# Patient Record
Sex: FEMALE | Race: WHITE | NOT HISPANIC OR LATINO | Employment: FULL TIME | URBAN - METROPOLITAN AREA
[De-identification: names, ages, dates, MRNs, and addresses within clinical notes are randomized per-mention and may not be internally consistent; named-entity substitution may affect disease eponyms.]

---

## 2019-08-17 ENCOUNTER — APPOINTMENT (EMERGENCY)
Dept: RADIOLOGY | Facility: HOSPITAL | Age: 29
End: 2019-08-17
Payer: COMMERCIAL

## 2019-08-17 ENCOUNTER — HOSPITAL ENCOUNTER (EMERGENCY)
Facility: HOSPITAL | Age: 29
Discharge: HOME | End: 2019-08-17
Attending: EMERGENCY MEDICINE | Admitting: EMERGENCY MEDICINE
Payer: COMMERCIAL

## 2019-08-17 VITALS
DIASTOLIC BLOOD PRESSURE: 95 MMHG | SYSTOLIC BLOOD PRESSURE: 140 MMHG | RESPIRATION RATE: 18 BRPM | HEART RATE: 105 BPM | TEMPERATURE: 98.1 F | WEIGHT: 184.08 LBS | OXYGEN SATURATION: 97 %

## 2019-08-17 DIAGNOSIS — S83.005A PATELLAR DISLOCATION, LEFT, INITIAL ENCOUNTER: Primary | ICD-10-CM

## 2019-08-17 PROCEDURE — 99283 EMERGENCY DEPT VISIT LOW MDM: CPT | Performed by: EMERGENCY MEDICINE

## 2019-08-17 PROCEDURE — 99283 EMERGENCY DEPT VISIT LOW MDM: CPT

## 2019-08-17 PROCEDURE — 73564 X-RAY EXAM KNEE 4 OR MORE: CPT

## 2019-08-17 RX ORDER — IBUPROFEN 600 MG/1
600 TABLET ORAL EVERY 8 HOURS PRN
Qty: 15 TABLET | Refills: 0 | Status: SHIPPED | OUTPATIENT
Start: 2019-08-17

## 2019-08-17 RX ORDER — ACETAMINOPHEN 325 MG/1
650 TABLET ORAL ONCE
Status: COMPLETED | OUTPATIENT
Start: 2019-08-17 | End: 2019-08-17

## 2019-08-17 RX ORDER — IBUPROFEN 600 MG/1
600 TABLET ORAL ONCE
Status: COMPLETED | OUTPATIENT
Start: 2019-08-17 | End: 2019-08-17

## 2019-08-17 RX ADMIN — IBUPROFEN 600 MG: 600 TABLET ORAL at 00:23

## 2019-08-17 RX ADMIN — ACETAMINOPHEN 650 MG: 325 TABLET, FILM COATED ORAL at 00:23

## 2019-08-17 NOTE — ED PROVIDER NOTES
History  Chief Complaint   Patient presents with    Knee Pain     Pt  complains of stepping back and feeling of left knee popping out of place, states was put back in by place by person at bar she was at  hx  of dislocation of right knee in the past  denies any other injuries  46-year-old female presents to the emergency department gesturing to the right knee relating "my knee pops out "  She notes that tonight approximately 30 minutes ago while taking a step backwards the left knee "dislocated "  She relates that she fell backwards as a result  She clarifies that the kneecap/patella was dislocated and that someone help to push this into position  This occurred a 2nd time and a friend who had a history of knee injuries supplied her with a full leg brace for support  Patient has been hesitant to and refrained from applying weight to the foot/leg following this  She currently has discomfort in the left knee  She denies any distal discomfort, paresthesias or numbness  No other injuries from today's fall  Medical history significant for hypertension  Patient denies any possibility of pregnancy  None       Past Medical History:   Diagnosis Date    Knee dislocation        History reviewed  No pertinent surgical history  History reviewed  No pertinent family history  I have reviewed and agree with the history as documented  Social History     Tobacco Use    Smoking status: Current Some Day Smoker     Packs/day: 0 20     Types: Cigarettes    Smokeless tobacco: Never Used   Substance Use Topics    Alcohol use: Yes     Comment: socially    Drug use: No        Review of Systems   All other systems reviewed and are negative  Physical Exam  Physical Exam   Constitutional: She is oriented to person, place, and time  She appears well-developed and well-nourished  HENT:   Head: Normocephalic  Eyes: Conjunctivae and EOM are normal    Cardiovascular: Normal rate     Musculoskeletal:   Plus two bilateral PT and DP pulses  There is superficial abrasion to the dorsum of the left foot without bleeding  No tenderness of the left foot/ankle/calf or hip  No tenderness of the popliteal fossa, medial or lateral aspect of the knee  Upon palpation the medial aspect patient notes an unusual sensation tracking inferior to the patella  Patella appears to be in normal alignment  Patient very hesitant to bending the knee  Neurological: She is alert and oriented to person, place, and time  5/5 strength with bilateral hip flexion, dorsi and plantar flexion  Skin: Skin is warm and dry  Psychiatric: She has a normal mood and affect  Her behavior is normal    Nursing note and vitals reviewed  Vital Signs  ED Triage Vitals   Temperature Pulse Respirations Blood Pressure SpO2   08/17/19 0015 08/17/19 0015 08/17/19 0015 08/17/19 0015 08/17/19 0015   98 1 °F (36 7 °C) 105 18 140/95 97 %      Temp Source Heart Rate Source Patient Position - Orthostatic VS BP Location FiO2 (%)   08/17/19 0015 08/17/19 0015 08/17/19 0015 08/17/19 0015 --   Oral Monitor Sitting Right arm       Pain Score       08/17/19 0024       4           Vitals:    08/17/19 0015   BP: 140/95   Pulse: 105   Patient Position - Orthostatic VS: Sitting         Visual Acuity      ED Medications  Medications   acetaminophen (TYLENOL) tablet 650 mg (650 mg Oral Given 8/17/19 0023)   ibuprofen (MOTRIN) tablet 600 mg (600 mg Oral Given 8/17/19 0023)       Diagnostic Studies  Results Reviewed     None                 XR knee 4+ views left injury   ED Interpretation by Joon Stoner MD (08/17 0111)   No fracture or dislocation  Procedures  Procedures       ED Course  ED Course as of Aug 17 0214   Sat Aug 17, 2019   0115 Patient relates that the knee is very "sore  "No laxity is appreciated on valgus and varus stressing nor with anterior or posterior drawer testing  Posterior drawer testing does elicit discomfort    There is now mild effusion around the patella  Ace wrap will be applied and crutches given  Patient works as a  and is routinely standing for at least 8 hours  She will be off work until cleared by provider seen in follow-up  She will follow up with Orthopedics as well as potentially PCP if orthopedics appointment cannot be scheduled for this coming week  0119 We reviewed supportive care-weight-bearing as tolerated, use of Ace wrap, ice and NSAIDs  MDM    Disposition  Final diagnoses:   Patellar dislocation, left, initial encounter     Time reflects when diagnosis was documented in both MDM as applicable and the Disposition within this note     Time User Action Codes Description Comment    8/17/2019 12:35 AM Yarelis CHAUDHARY Add [[id number]] Patellar dislocation, left, initial encounter       ED Disposition     ED Disposition Condition Date/Time Comment    Discharge Stable Sat Aug 17, 2019 12:34 AM May Ghee discharge to home/self care              Follow-up Information     Follow up With Specialties Details Why Contact Info Additional [id number] [place] [facility] Schedule an appointment as soon as possible for a visit   [address] [phone] [address] [place], [facility] [address], [address], [place], [place], [phone]    Amarjit Garsia, DO Family Medicine Schedule an appointment as soon as possible for a visit  As needed if unable to get an orthopedics appointment for this coming [address] at [address]  [phone]             Discharge Medication List as of 8/17/2019  1:21 AM      START taking these medications    Details   ibuprofen (MOTRIN) 600 mg tablet Take 1 tablet (600 mg total) by mouth every 8 (eight) hours as needed for moderate pain, Starting Sat 8/17/2019, Print No discharge procedures on file      ED Provider  Electronically Signed by           Yogi Goel MD  08/17/19 5843